# Patient Record
Sex: FEMALE | Race: WHITE | Employment: OTHER | URBAN - METROPOLITAN AREA
[De-identification: names, ages, dates, MRNs, and addresses within clinical notes are randomized per-mention and may not be internally consistent; named-entity substitution may affect disease eponyms.]

---

## 2020-08-13 ENCOUNTER — APPOINTMENT (OUTPATIENT)
Dept: CT IMAGING | Age: 60
End: 2020-08-13
Payer: COMMERCIAL

## 2020-08-13 ENCOUNTER — HOSPITAL ENCOUNTER (OUTPATIENT)
Age: 60
Setting detail: OBSERVATION
Discharge: HOME | End: 2020-08-14
Attending: EMERGENCY MEDICINE | Admitting: INTERNAL MEDICINE
Payer: COMMERCIAL

## 2020-08-13 PROBLEM — N30.01 ACUTE CYSTITIS WITH HEMATURIA: Status: ACTIVE | Noted: 2020-08-13

## 2020-08-13 PROBLEM — E11.65 TYPE 2 DIABETES MELLITUS WITH HYPERGLYCEMIA (HCC): Status: ACTIVE | Noted: 2020-08-13

## 2020-08-13 LAB
-: ABNORMAL
ABSOLUTE EOS #: 0 K/UL (ref 0–0.4)
ABSOLUTE IMMATURE GRANULOCYTE: ABNORMAL K/UL (ref 0–0.3)
ABSOLUTE LYMPH #: 1.2 K/UL (ref 1–4.8)
ABSOLUTE MONO #: 0.2 K/UL (ref 0.1–1.2)
ALBUMIN SERPL-MCNC: 4.2 G/DL (ref 3.5–5.2)
ALBUMIN/GLOBULIN RATIO: 1.3 (ref 1–2.5)
ALP BLD-CCNC: 114 U/L (ref 35–104)
ALT SERPL-CCNC: 21 U/L (ref 5–33)
AMORPHOUS: ABNORMAL
AMYLASE: 59 U/L (ref 28–100)
ANION GAP SERPL CALCULATED.3IONS-SCNC: 11 MMOL/L (ref 9–17)
AST SERPL-CCNC: 17 U/L
BACTERIA: ABNORMAL
BASOPHILS # BLD: 0 % (ref 0–2)
BASOPHILS ABSOLUTE: 0 K/UL (ref 0–0.2)
BILIRUB SERPL-MCNC: 0.32 MG/DL (ref 0.3–1.2)
BILIRUBIN DIRECT: 0.12 MG/DL
BILIRUBIN URINE: NEGATIVE
BILIRUBIN, INDIRECT: 0.2 MG/DL (ref 0–1)
BUN BLDV-MCNC: 14 MG/DL (ref 8–23)
BUN/CREAT BLD: ABNORMAL (ref 9–20)
CALCIUM SERPL-MCNC: 9.2 MG/DL (ref 8.6–10.4)
CASTS UA: ABNORMAL /LPF
CHLORIDE BLD-SCNC: 102 MMOL/L (ref 98–107)
CO2: 27 MMOL/L (ref 20–31)
COLOR: YELLOW
COMMENT UA: ABNORMAL
CREAT SERPL-MCNC: 0.66 MG/DL (ref 0.5–0.9)
CRYSTALS, UA: ABNORMAL /HPF
DIFFERENTIAL TYPE: ABNORMAL
EOSINOPHILS RELATIVE PERCENT: 0 % (ref 1–4)
EPITHELIAL CELLS UA: ABNORMAL /HPF (ref 0–5)
ESTIMATED AVERAGE GLUCOSE: 194 MG/DL
GFR AFRICAN AMERICAN: >60 ML/MIN
GFR NON-AFRICAN AMERICAN: >60 ML/MIN
GFR SERPL CREATININE-BSD FRML MDRD: ABNORMAL ML/MIN/{1.73_M2}
GFR SERPL CREATININE-BSD FRML MDRD: ABNORMAL ML/MIN/{1.73_M2}
GLOBULIN: ABNORMAL G/DL (ref 1.5–3.8)
GLUCOSE BLD-MCNC: 119 MG/DL (ref 65–105)
GLUCOSE BLD-MCNC: 252 MG/DL (ref 65–105)
GLUCOSE BLD-MCNC: 382 MG/DL (ref 70–99)
GLUCOSE BLD-MCNC: 91 MG/DL (ref 65–105)
GLUCOSE URINE: ABNORMAL
HBA1C MFR BLD: 8.4 % (ref 4–6)
HCT VFR BLD CALC: 39 % (ref 36–46)
HCT VFR BLD CALC: 42.9 % (ref 36–46)
HEMOGLOBIN: 13.6 G/DL (ref 12–16)
HEMOGLOBIN: 14.6 G/DL (ref 12–16)
IMMATURE GRANULOCYTES: ABNORMAL %
KETONES, URINE: NEGATIVE
LACTIC ACID: 1.9 MMOL/L (ref 0.5–2.2)
LEUKOCYTE ESTERASE, URINE: NEGATIVE
LIPASE: 79 U/L (ref 13–60)
LYMPHOCYTES # BLD: 15 % (ref 24–44)
MCH RBC QN AUTO: 32.5 PG (ref 26–34)
MCHC RBC AUTO-ENTMCNC: 34.1 G/DL (ref 31–37)
MCV RBC AUTO: 95.1 FL (ref 80–100)
MONOCYTES # BLD: 2 % (ref 2–11)
MUCUS: ABNORMAL
NITRITE, URINE: NEGATIVE
NRBC AUTOMATED: ABNORMAL PER 100 WBC
OTHER OBSERVATIONS UA: ABNORMAL
PDW BLD-RTO: 12.3 % (ref 12.5–15.4)
PH UA: 7 (ref 5–8)
PLATELET # BLD: 209 K/UL (ref 140–450)
PLATELET ESTIMATE: ABNORMAL
PMV BLD AUTO: 8 FL (ref 6–12)
POTASSIUM SERPL-SCNC: 4.2 MMOL/L (ref 3.7–5.3)
PROTEIN UA: NEGATIVE
RBC # BLD: 4.51 M/UL (ref 4–5.2)
RBC # BLD: ABNORMAL 10*6/UL
RBC UA: ABNORMAL /HPF (ref 0–2)
RENAL EPITHELIAL, UA: ABNORMAL /HPF
SARS-COV-2, PCR: NORMAL
SARS-COV-2, RAPID: NOT DETECTED
SARS-COV-2: NORMAL
SEG NEUTROPHILS: 83 % (ref 36–66)
SEGMENTED NEUTROPHILS ABSOLUTE COUNT: 6.5 K/UL (ref 1.8–7.7)
SODIUM BLD-SCNC: 140 MMOL/L (ref 135–144)
SOURCE: NORMAL
SPECIFIC GRAVITY UA: 1.01 (ref 1–1.03)
TOTAL PROTEIN: 7.4 G/DL (ref 6.4–8.3)
TRICHOMONAS: ABNORMAL
TROPONIN INTERP: ABNORMAL
TROPONIN T: ABNORMAL NG/ML
TROPONIN, HIGH SENSITIVITY: 15 NG/L (ref 0–14)
TURBIDITY: CLEAR
URINE HGB: ABNORMAL
UROBILINOGEN, URINE: NORMAL
WBC # BLD: 7.9 K/UL (ref 3.5–11)
WBC # BLD: ABNORMAL 10*3/UL
WBC UA: ABNORMAL /HPF (ref 0–5)
YEAST: ABNORMAL

## 2020-08-13 PROCEDURE — 85014 HEMATOCRIT: CPT

## 2020-08-13 PROCEDURE — 83605 ASSAY OF LACTIC ACID: CPT

## 2020-08-13 PROCEDURE — 96372 THER/PROPH/DIAG INJ SC/IM: CPT

## 2020-08-13 PROCEDURE — 36415 COLL VENOUS BLD VENIPUNCTURE: CPT

## 2020-08-13 PROCEDURE — 84484 ASSAY OF TROPONIN QUANT: CPT

## 2020-08-13 PROCEDURE — APPSS180 APP SPLIT SHARED TIME > 60 MINUTES: Performed by: NURSE PRACTITIONER

## 2020-08-13 PROCEDURE — 99244 OFF/OP CNSLTJ NEW/EST MOD 40: CPT | Performed by: INTERNAL MEDICINE

## 2020-08-13 PROCEDURE — 6370000000 HC RX 637 (ALT 250 FOR IP): Performed by: EMERGENCY MEDICINE

## 2020-08-13 PROCEDURE — 86003 ALLG SPEC IGE CRUDE XTRC EA: CPT

## 2020-08-13 PROCEDURE — 96375 TX/PRO/DX INJ NEW DRUG ADDON: CPT

## 2020-08-13 PROCEDURE — 2580000003 HC RX 258: Performed by: INTERNAL MEDICINE

## 2020-08-13 PROCEDURE — 2580000003 HC RX 258: Performed by: NURSE PRACTITIONER

## 2020-08-13 PROCEDURE — G0378 HOSPITAL OBSERVATION PER HR: HCPCS

## 2020-08-13 PROCEDURE — 6370000000 HC RX 637 (ALT 250 FOR IP): Performed by: NURSE PRACTITIONER

## 2020-08-13 PROCEDURE — 6360000002 HC RX W HCPCS: Performed by: INTERNAL MEDICINE

## 2020-08-13 PROCEDURE — 82150 ASSAY OF AMYLASE: CPT

## 2020-08-13 PROCEDURE — 83690 ASSAY OF LIPASE: CPT

## 2020-08-13 PROCEDURE — 99219 PR INITIAL OBSERVATION CARE/DAY 50 MINUTES: CPT | Performed by: INTERNAL MEDICINE

## 2020-08-13 PROCEDURE — 82785 ASSAY OF IGE: CPT

## 2020-08-13 PROCEDURE — 83036 HEMOGLOBIN GLYCOSYLATED A1C: CPT

## 2020-08-13 PROCEDURE — C9113 INJ PANTOPRAZOLE SODIUM, VIA: HCPCS | Performed by: INTERNAL MEDICINE

## 2020-08-13 PROCEDURE — 96365 THER/PROPH/DIAG IV INF INIT: CPT

## 2020-08-13 PROCEDURE — 83516 IMMUNOASSAY NONANTIBODY: CPT

## 2020-08-13 PROCEDURE — 81001 URINALYSIS AUTO W/SCOPE: CPT

## 2020-08-13 PROCEDURE — 80076 HEPATIC FUNCTION PANEL: CPT

## 2020-08-13 PROCEDURE — 6360000002 HC RX W HCPCS: Performed by: EMERGENCY MEDICINE

## 2020-08-13 PROCEDURE — 93005 ELECTROCARDIOGRAM TRACING: CPT | Performed by: EMERGENCY MEDICINE

## 2020-08-13 PROCEDURE — U0002 COVID-19 LAB TEST NON-CDC: HCPCS

## 2020-08-13 PROCEDURE — 85025 COMPLETE CBC W/AUTO DIFF WBC: CPT

## 2020-08-13 PROCEDURE — 80048 BASIC METABOLIC PNL TOTAL CA: CPT

## 2020-08-13 PROCEDURE — 96374 THER/PROPH/DIAG INJ IV PUSH: CPT

## 2020-08-13 PROCEDURE — 2580000003 HC RX 258: Performed by: EMERGENCY MEDICINE

## 2020-08-13 PROCEDURE — 82947 ASSAY GLUCOSE BLOOD QUANT: CPT

## 2020-08-13 PROCEDURE — 99285 EMERGENCY DEPT VISIT HI MDM: CPT

## 2020-08-13 PROCEDURE — 74176 CT ABD & PELVIS W/O CONTRAST: CPT

## 2020-08-13 PROCEDURE — 85018 HEMOGLOBIN: CPT

## 2020-08-13 PROCEDURE — 6360000002 HC RX W HCPCS: Performed by: NURSE PRACTITIONER

## 2020-08-13 RX ORDER — MORPHINE SULFATE 2 MG/ML
2 INJECTION, SOLUTION INTRAMUSCULAR; INTRAVENOUS
Status: DISCONTINUED | OUTPATIENT
Start: 2020-08-13 | End: 2020-08-14 | Stop reason: HOSPADM

## 2020-08-13 RX ORDER — DEXTROSE MONOHYDRATE 25 G/50ML
12.5 INJECTION, SOLUTION INTRAVENOUS PRN
Status: DISCONTINUED | OUTPATIENT
Start: 2020-08-13 | End: 2020-08-14 | Stop reason: HOSPADM

## 2020-08-13 RX ORDER — SODIUM CHLORIDE 9 MG/ML
INJECTION, SOLUTION INTRAVENOUS CONTINUOUS
Status: DISCONTINUED | OUTPATIENT
Start: 2020-08-13 | End: 2020-08-14

## 2020-08-13 RX ORDER — ATORVASTATIN CALCIUM 20 MG/1
20 TABLET, FILM COATED ORAL DAILY
COMMUNITY

## 2020-08-13 RX ORDER — 0.9 % SODIUM CHLORIDE 0.9 %
1000 INTRAVENOUS SOLUTION INTRAVENOUS ONCE
Status: COMPLETED | OUTPATIENT
Start: 2020-08-13 | End: 2020-08-13

## 2020-08-13 RX ORDER — ONDANSETRON 2 MG/ML
4 INJECTION INTRAMUSCULAR; INTRAVENOUS ONCE
Status: COMPLETED | OUTPATIENT
Start: 2020-08-13 | End: 2020-08-13

## 2020-08-13 RX ORDER — MAGNESIUM SULFATE 1 G/100ML
1 INJECTION INTRAVENOUS PRN
Status: DISCONTINUED | OUTPATIENT
Start: 2020-08-13 | End: 2020-08-14 | Stop reason: HOSPADM

## 2020-08-13 RX ORDER — NICOTINE POLACRILEX 4 MG
15 LOZENGE BUCCAL PRN
Status: DISCONTINUED | OUTPATIENT
Start: 2020-08-13 | End: 2020-08-14 | Stop reason: HOSPADM

## 2020-08-13 RX ORDER — DULAGLUTIDE 1.5 MG/.5ML
1.5 INJECTION, SOLUTION SUBCUTANEOUS WEEKLY
Status: ON HOLD | COMMUNITY
End: 2020-08-14 | Stop reason: HOSPADM

## 2020-08-13 RX ORDER — SODIUM CHLORIDE 9 MG/ML
10 INJECTION INTRAVENOUS 2 TIMES DAILY
Status: DISCONTINUED | OUTPATIENT
Start: 2020-08-13 | End: 2020-08-14 | Stop reason: HOSPADM

## 2020-08-13 RX ORDER — MORPHINE SULFATE 2 MG/ML
2 INJECTION, SOLUTION INTRAMUSCULAR; INTRAVENOUS EVERY 4 HOURS PRN
Status: DISCONTINUED | OUTPATIENT
Start: 2020-08-13 | End: 2020-08-13 | Stop reason: DRUGHIGH

## 2020-08-13 RX ORDER — POTASSIUM CHLORIDE 7.45 MG/ML
10 INJECTION INTRAVENOUS PRN
Status: DISCONTINUED | OUTPATIENT
Start: 2020-08-13 | End: 2020-08-14 | Stop reason: HOSPADM

## 2020-08-13 RX ORDER — SODIUM CHLORIDE 0.9 % (FLUSH) 0.9 %
10 SYRINGE (ML) INJECTION EVERY 12 HOURS SCHEDULED
Status: DISCONTINUED | OUTPATIENT
Start: 2020-08-13 | End: 2020-08-14 | Stop reason: HOSPADM

## 2020-08-13 RX ORDER — ATORVASTATIN CALCIUM 10 MG/1
20 TABLET, FILM COATED ORAL DAILY
Status: DISCONTINUED | OUTPATIENT
Start: 2020-08-13 | End: 2020-08-14 | Stop reason: HOSPADM

## 2020-08-13 RX ORDER — ACETAMINOPHEN 325 MG/1
650 TABLET ORAL EVERY 6 HOURS PRN
Status: DISCONTINUED | OUTPATIENT
Start: 2020-08-13 | End: 2020-08-14 | Stop reason: HOSPADM

## 2020-08-13 RX ORDER — SODIUM CHLORIDE 0.9 % (FLUSH) 0.9 %
10 SYRINGE (ML) INJECTION PRN
Status: DISCONTINUED | OUTPATIENT
Start: 2020-08-13 | End: 2020-08-14 | Stop reason: HOSPADM

## 2020-08-13 RX ORDER — MORPHINE SULFATE 2 MG/ML
2 INJECTION, SOLUTION INTRAMUSCULAR; INTRAVENOUS ONCE
Status: COMPLETED | OUTPATIENT
Start: 2020-08-13 | End: 2020-08-13

## 2020-08-13 RX ORDER — POTASSIUM CHLORIDE 20 MEQ/1
40 TABLET, EXTENDED RELEASE ORAL PRN
Status: DISCONTINUED | OUTPATIENT
Start: 2020-08-13 | End: 2020-08-14 | Stop reason: HOSPADM

## 2020-08-13 RX ORDER — PROMETHAZINE HYDROCHLORIDE 25 MG/1
12.5 TABLET ORAL EVERY 6 HOURS PRN
Status: DISCONTINUED | OUTPATIENT
Start: 2020-08-13 | End: 2020-08-14 | Stop reason: HOSPADM

## 2020-08-13 RX ORDER — ACETAMINOPHEN 650 MG/1
650 SUPPOSITORY RECTAL EVERY 6 HOURS PRN
Status: DISCONTINUED | OUTPATIENT
Start: 2020-08-13 | End: 2020-08-14 | Stop reason: HOSPADM

## 2020-08-13 RX ORDER — DEXTROSE MONOHYDRATE 50 MG/ML
100 INJECTION, SOLUTION INTRAVENOUS PRN
Status: DISCONTINUED | OUTPATIENT
Start: 2020-08-13 | End: 2020-08-14 | Stop reason: HOSPADM

## 2020-08-13 RX ORDER — PANTOPRAZOLE SODIUM 40 MG/10ML
40 INJECTION, POWDER, LYOPHILIZED, FOR SOLUTION INTRAVENOUS 2 TIMES DAILY
Status: DISCONTINUED | OUTPATIENT
Start: 2020-08-13 | End: 2020-08-14 | Stop reason: HOSPADM

## 2020-08-13 RX ORDER — NICOTINE 21 MG/24HR
1 PATCH, TRANSDERMAL 24 HOURS TRANSDERMAL DAILY PRN
Status: DISCONTINUED | OUTPATIENT
Start: 2020-08-13 | End: 2020-08-14 | Stop reason: HOSPADM

## 2020-08-13 RX ORDER — POLYETHYLENE GLYCOL 3350 17 G/17G
17 POWDER, FOR SOLUTION ORAL DAILY PRN
Status: DISCONTINUED | OUTPATIENT
Start: 2020-08-13 | End: 2020-08-14 | Stop reason: HOSPADM

## 2020-08-13 RX ORDER — ONDANSETRON 2 MG/ML
4 INJECTION INTRAMUSCULAR; INTRAVENOUS EVERY 6 HOURS PRN
Status: DISCONTINUED | OUTPATIENT
Start: 2020-08-13 | End: 2020-08-14 | Stop reason: HOSPADM

## 2020-08-13 RX ADMIN — SODIUM CHLORIDE: 9 INJECTION, SOLUTION INTRAVENOUS at 23:51

## 2020-08-13 RX ADMIN — ONDANSETRON 4 MG: 2 INJECTION INTRAMUSCULAR; INTRAVENOUS at 10:07

## 2020-08-13 RX ADMIN — SODIUM CHLORIDE: 9 INJECTION, SOLUTION INTRAVENOUS at 13:35

## 2020-08-13 RX ADMIN — ENOXAPARIN SODIUM 40 MG: 40 INJECTION SUBCUTANEOUS at 13:48

## 2020-08-13 RX ADMIN — ATORVASTATIN CALCIUM 20 MG: 10 TABLET, FILM COATED ORAL at 15:44

## 2020-08-13 RX ADMIN — SODIUM CHLORIDE 10 ML: 9 INJECTION, SOLUTION INTRAMUSCULAR; INTRAVENOUS; SUBCUTANEOUS at 20:07

## 2020-08-13 RX ADMIN — CEFTRIAXONE SODIUM 1 G: 1 INJECTION, POWDER, FOR SOLUTION INTRAMUSCULAR; INTRAVENOUS at 13:48

## 2020-08-13 RX ADMIN — SODIUM CHLORIDE 1000 ML: 9 INJECTION, SOLUTION INTRAVENOUS at 10:06

## 2020-08-13 RX ADMIN — INSULIN HUMAN 8 UNITS: 100 INJECTION, SOLUTION PARENTERAL at 11:14

## 2020-08-13 RX ADMIN — INSULIN LISPRO 6 UNITS: 100 INJECTION, SOLUTION INTRAVENOUS; SUBCUTANEOUS at 13:47

## 2020-08-13 RX ADMIN — MORPHINE SULFATE 2 MG: 2 INJECTION, SOLUTION INTRAMUSCULAR; INTRAVENOUS at 10:06

## 2020-08-13 RX ADMIN — PANTOPRAZOLE SODIUM 40 MG: 40 INJECTION, POWDER, FOR SOLUTION INTRAVENOUS at 20:07

## 2020-08-13 SDOH — HEALTH STABILITY: MENTAL HEALTH: HOW OFTEN DO YOU HAVE A DRINK CONTAINING ALCOHOL?: NEVER

## 2020-08-13 ASSESSMENT — ENCOUNTER SYMPTOMS
WHEEZING: 0
BACK PAIN: 1
STRIDOR: 0
CONSTIPATION: 0
NAUSEA: 1
ABDOMINAL PAIN: 1
DIARRHEA: 1
VOMITING: 1
BLOOD IN STOOL: 0
SHORTNESS OF BREATH: 0
COUGH: 0

## 2020-08-13 ASSESSMENT — PAIN DESCRIPTION - LOCATION: LOCATION: ABDOMEN

## 2020-08-13 ASSESSMENT — PAIN SCALES - GENERAL
PAINLEVEL_OUTOF10: 6
PAINLEVEL_OUTOF10: 6
PAINLEVEL_OUTOF10: 0
PAINLEVEL_OUTOF10: 0

## 2020-08-13 NOTE — ED NOTES
Message sent via [facility] to [facility] on call regarding potential admission per ER MD request.     Amber Samano RN  08/13/20 8726

## 2020-08-13 NOTE — H&P
[facility]    HISTORY AND PHYSICAL EXAMINATION            Date:   8/13/2020  Patient name:  Mago Kirby  Date of admission:  8/13/2020  9:15 AM  MRN:   [id number]  Account:  [de-identified]  YOB: 1960  PCP:    No primary care provider on file. Room:   [address] [place] [address]  Code Status:    Full Code    Chief Complaint:     Chief Complaint   Patient presents with    Nausea    Emesis       History Obtained From:     patient    History of Present Illness:     Mago Kirby is a 63 y.o. Non-/non  female who presents with Nausea and Emesis   and is admitted to the hospital for the management of Type 2 diabetes mellitus with hyperglycemia ([facility] [address] Patient presented to the emergency room with complaints of right lower back pain, nausea, vomiting and diarrhea that started approximately 4:00 this morning. She described her right lower back pain as sharp and stabbing, she said her nausea was initially undigested food but then became bloody. She describes her diarrhea as soft and brown. She also reports urinary frequency and blood when she wiped after voiding earlier today. She denies fever, chills, chest pain or shortness of breath. The patient is a diabetic and normally takes Glucophage twice a day and Trulicity but has not had her Trulicity since April. She states she came back to this area from [place] to take care of her ailing mother-in-law because of insurance issues and labs she ran out. She states she has been taking the Glucophage but mostly once a day. She states on average when she checks her blood sugar its between 200-260. Her only other history is hyperlipidemia. She does state that she is been told in the past she may be lactose intolerant. She states she had an EGD/colonoscopy in her 25s and was told she had gallstones at that time. On arrival to the ER vitals are stable.   CT of the abdomen and pelvis without contrast shows no evidence of acute intra-abdominal or intrapelvic pathology. BUN and creatinine within normal limits blood glucose was 382. She was given 8 units of regular insulin and her sugar came down to 252. Amylase 59 lipase 79. CBC unremarkable. COVID-19 swab negative. Urine is positive for 3+ glucose, 20-50 RBCs, few bacteria and a large amount hemoglobin. EKG shows normal sinus rhythm with PACs, left axis deviation, and a left bundle branch block. Past Medical History:     Past Medical History:   Diagnosis Date    Diabetes mellitus ([address]     Hyperlipidemia     Normal pressure glaucoma         Past Surgical History:     Past Surgical History:   Procedure Laterality Date    ABDOMEN SURGERY      CHOLECYSTECTOMY          Medications Prior to Admission:     Prior to Admission medications    Medication Sig Start Date End Date Taking? Authorizing Provider   metFORMIN (GLUCOPHAGE) 500 MG tablet Take 500 mg by mouth 2 times daily (with meals)   Yes Historical Provider, MD   atorvastatin (LIPITOR) 20 MG tablet Take 20 mg by mouth daily   Yes Historical Provider, MD   Dulaglutide (TRULICITY) 1.5 VW/7.0WE SOPN Inject 1.5 mg into the skin once a week   Yes Historical Provider, MD        Allergies:     Patient has no known allergies. Social History:     Tobacco:    reports that she has never smoked. She has never used smokeless tobacco.  Alcohol:      reports no history of alcohol use. Drug Use:  reports no history of drug use. Family History:     Family History   Problem Relation Age of Onset   [facility] Glaucoma Mother     Hypertension Father     Stroke Father     Dementia Father     No Known Problems Sister     Diabetes Brother     Hypertension Brother     Other Other         frequent kidney stones       Review of Systems:     Positive and Negative as described in HPI. Review of Systems   Constitutional: Negative for chills, diaphoresis and fever.    HENT: Negative for congestion and hearing loss. Respiratory: Negative for cough, shortness of breath, wheezing and stridor. Cardiovascular: Negative for chest pain, palpitations and leg swelling. Gastrointestinal: Positive for abdominal pain, diarrhea, nausea and vomiting. Negative for blood in stool and constipation. Genitourinary: Positive for frequency and hematuria. Negative for dysuria. Musculoskeletal: Positive for back pain. Negative for myalgias. Skin: Negative for rash. Neurological: Negative for dizziness, seizures and headaches. Psychiatric/Behavioral: The patient is not nervous/anxious. Physical Exam:   BP (!) 150/81   Pulse 85   Temp 97.5 °F (36.4 °C) (Oral)   Resp 18   Ht 5' 5\" (1.651 m)   Wt 215 lb (97.5 kg)   SpO2 98%   BMI 35.78 kg/m²   Temp (24hrs), Av.9 °F (36.6 °C), Min:97.5 °F (36.4 °C), Max:98.7 °F (37.1 °C)    Recent Labs     20  1328 20  1559   POCGLU 252* 119*       Intake/Output Summary (Last 24 hours) at 2020 1633  Last data filed at 2020 1546  Gross per 24 hour   Intake 1000 ml   Output 600 ml   Net 400 ml       Physical Exam  Vitals signs and nursing note reviewed. Constitutional:       Appearance: Normal appearance. HENT:      Mouth/Throat:      Mouth: Mucous membranes are moist.   Eyes:      Extraocular Movements: Extraocular movements intact. Neck:      Musculoskeletal: Normal range of motion. Cardiovascular:      Rate and Rhythm: Normal rate and regular rhythm. Pulses: Normal pulses. Heart sounds: Normal heart sounds. Pulmonary:      Effort: Pulmonary effort is normal.      Breath sounds: Normal breath sounds. Abdominal:      General: Bowel sounds are normal.      Palpations: Abdomen is soft. Tenderness: There is abdominal tenderness. There is right CVA tenderness. Comments:  The patient reports lower quadrant abdominal pain initially but that is resolved since pain medicines in the ER   Musculoskeletal:      Right lower le+ 32.5 26 - 34 pg    MCHC 34.1 31 - 37 g/dL    RDW 12.3 (L) 12.5 - 15.4 %    Platelets 824 991 - 071 k/uL    MPV 8.0 6.0 - 12.0 fL    NRBC Automated NOT REPORTED per 100 WBC    Differential Type NOT REPORTED     Seg Neutrophils 83 (H) 36 - 66 %    Lymphocytes 15 (L) 24 - 44 %    Monocytes 2 2 - 11 %    Eosinophils % 0 (L) 1 - 4 %    Basophils 0 0 - 2 %    Immature Granulocytes NOT REPORTED 0 %    Segs Absolute 6.50 1.8 - 7.7 k/uL    Absolute Lymph # 1.20 1.0 - 4.8 k/uL    Absolute Mono # 0.20 0.1 - 1.2 k/uL    Absolute Eos # 0.00 0.0 - 0.4 k/uL    Basophils Absolute 0.00 0.0 - 0.2 k/uL    Absolute Immature Granulocyte NOT REPORTED 0.00 - 0.30 k/uL    WBC Morphology NOT REPORTED     RBC Morphology NOT REPORTED     Platelet Estimate NOT REPORTED    Lactic Acid    Collection Time: 08/13/20 10:00 AM   Result Value Ref Range    Lactic Acid 1.9 0.5 - 2.2 mmol/L   Troponin    Collection Time: 08/13/20 10:00 AM   Result Value Ref Range    Troponin, High Sensitivity 15 (H) 0 - 14 ng/L    Troponin T NOT REPORTED <0.03 ng/mL    Troponin Interp NOT REPORTED    Urinalysis Reflex to Culture    Collection Time: 08/13/20 10:00 AM   Result Value Ref Range    Color, UA YELLOW YELLOW    Turbidity UA CLEAR CLEAR    Glucose, Ur 3+ (A) NEGATIVE    Bilirubin Urine NEGATIVE NEGATIVE    Ketones, Urine NEGATIVE NEGATIVE    Specific Gravity, UA 1.015 1.005 - 1.030    Urine Hgb LARGE (A) NEGATIVE    pH, UA 7.0 5.0 - 8.0    Protein, UA NEGATIVE NEGATIVE    Urobilinogen, Urine Normal Normal    Nitrite, Urine NEGATIVE NEGATIVE    Leukocyte Esterase, Urine NEGATIVE NEGATIVE    Urinalysis Comments NOT REPORTED    Microscopic Urinalysis    Collection Time: 08/13/20 10:00 AM   Result Value Ref Range    -          WBC, UA 0 TO 2 0 - 5 /HPF    RBC, UA 20 TO 50 0 - 2 /HPF    Casts UA NOT REPORTED /LPF    Crystals, UA NOT REPORTED None /HPF    Epithelial Cells UA 0 TO 2 0 - 5 /HPF    Renal Epithelial, UA NOT REPORTED 0 /HPF    Bacteria, UA FEW (A) None hematuria; Rocephin 1 g IV every 24 hours pending urine culture    Lovenox for DVT prophylaxis    Antiemetics and pain medications as needed    Consult GI related to complaint of hematemesis    Follow labs    Monitor I&O and telemtry      Consultations:   [id number] , APRN - CNP  8/13/2020  4:33 PM    Copy sent to Dr. Titus Skiff primary care provider on file.

## 2020-08-13 NOTE — PLAN OF CARE
Problem: Infection:  Goal: Will remain free from infection  Description: Will remain free from infection  Outcome: Ongoing     Problem: Daily Care:  Goal: Daily care needs are met  Description: Daily care needs are met  Outcome: Ongoing     Problem: Pain:  Goal: Patient's pain/discomfort is manageable  Description: Patient's pain/discomfort is manageable  Outcome: Ongoing     Problem: Skin Integrity:  Goal: Skin integrity will stabilize  Description: Skin integrity will stabilize  Outcome: Ongoing     Problem: Discharge Planning:  Goal: Patients continuum of care needs are met  Description: Patients continuum of care needs are met  Outcome: Ongoing

## 2020-08-13 NOTE — ED PROVIDER NOTES
[id number] [facility]  [address] [facility] [address]  Phone: [phone]  Fax: [phone]        Pt Name: Sydney Torres  MRN: [id number]  [place] 1960  Date of evaluation: 8/13/20      CHIEF COMPLAINT     Chief Complaint   Patient presents with    Nausea    Emesis         HISTORY OF PRESENT ILLNESS  (Location/Symptom, Timing/Onset, Context/Setting, Quality, Duration, Modifying Factors, Severity.)    Sydney Torres is a 63 y.o. female who presents with abdominal pain. The patient states that she is a diabetic she ran out of her Trulicity a few months ago she has been taking Glucophage her blood sugars been elevated and this morning she had abdominal pain with nausea vomiting and diarrhea the patient questions if there may have been some blood in her emesis with some streaks of blood denies any chest pain or shortness of breath also have some right flank pain denies any dysuria no fever no chills nothing she does makes her symptoms better or worse      REVIEW OF SYSTEMS    (2-9 systems for level 4, 10 or more for level 5)     Review of Systems   Gastrointestinal: Positive for abdominal pain, diarrhea, nausea and vomiting. Genitourinary: Positive for hematuria. All other systems reviewed and are negative. PAST MEDICAL HISTORY    has a past medical history of Diabetes mellitus ([address] and Hyperlipidemia. SURGICAL HISTORY      has a past surgical history that includes Cholecystectomy and Abdomen surgery. CURRENTMEDICATIONS       Previous Medications    ATORVASTATIN (LIPITOR) 20 MG TABLET    Take 20 mg by mouth daily    DULAGLUTIDE (TRULICITY) 1.5 MR/3.7UP SOPN    Inject 1.5 mg into the skin once a week    METFORMIN (GLUCOPHAGE) 500 MG TABLET    Take 500 mg by mouth 2 times daily (with meals)       ALLERGIES     has No Known Allergies. FAMILY HISTORY     has no family status information on file. family history is not on file.     SOCIAL HISTORY      reports that she has never smoked. She has never used smokeless tobacco. She reports that she does not drink alcohol or use drugs. PHYSICAL EXAM    (up to 7 for level 4, 8 or more for level 5)   INITIAL VITALS:  height is 5' 5\" (1.651 m) and weight is 97.5 kg (215 lb). Her oral temperature is 98.7 °F (37.1 °C). Her blood pressure is 145/87 (abnormal) and her pulse is 84. Her respiration is 16 and oxygen saturation is 97%. Physical Exam  Vitals signs and nursing note reviewed. Constitutional:       Appearance: Normal appearance. HENT:      Head: Normocephalic and atraumatic. Eyes:      Conjunctiva/sclera: Conjunctivae normal.   Neck:      Musculoskeletal: Normal range of motion and neck supple. Cardiovascular:      Rate and Rhythm: Normal rate and regular rhythm. Pulses: Normal pulses. Heart sounds: Normal heart sounds. Pulmonary:      Effort: Pulmonary effort is normal. No respiratory distress. Breath sounds: Normal breath sounds. No stridor. No wheezing, rhonchi or rales. Abdominal:      General: Bowel sounds are normal. There is no distension. Palpations: Abdomen is soft. There is no mass. Tenderness: There is no abdominal tenderness. There is right CVA tenderness. There is no guarding or rebound. Musculoskeletal: Normal range of motion. Skin:     General: Skin is warm and dry. Neurological:      General: No focal deficit present. Mental Status: She is alert.          DIFFERENTIAL DIAGNOSIS/ MDM:     I will establish an IV give the patient IV fluids check labs and EKG and a UA we will also get a CT of the abdomen and pelvis    DIAGNOSTIC RESULTS     EKG: All EKG's are interpreted by the Emergency Department Physician who either signs or Co-signs this chart in the [address] a cardiologist.      Interpreted by Robyn Cook MD     Rhythm: normal sinus   Rate: 90  Axis: -39  Ectopy: Premature atrial complex  Conduction: Left bundle branch block  ST Segments: no acute obstruction or inflammation.  No   free intraperitoneal air or fluid.  Small bowel loops are normal in caliber. No evidence for hiatal hernia.  Appendix is unremarkable. Pelvis: No evidence for free fluid. Retroperitoneum: Right adrenal gland is unremarkable.  Left adrenal adenoma. Bilateral peripelvic renal cysts.  Ureters are unobstructed.  Urinary bladder   is unremarkable. Vasculature:  The aorta is normal in caliber.  No definite lymphadenopathy   identified. Bones/Soft Tissues: No evidence for acute fracture or dislocation.  No lytic   or blastic lesions.                    LABS:  Results for orders placed or performed during the hospital encounter of 08/13/20   Hepatic Function Panel   Result Value Ref Range    Alb 4.2 3.5 - 5.2 g/dL    Alkaline Phosphatase 114 (H) 35 - 104 U/L    ALT 21 5 - 33 U/L    AST 17 <32 U/L    Total Bilirubin 0.32 0.3 - 1.2 mg/dL    Bilirubin, Direct 0.12 <0.31 mg/dL    Bilirubin, Indirect 0.20 0.00 - 1.00 mg/dL    Total Protein 7.4 6.4 - 8.3 g/dL    Globulin NOT REPORTED 1.5 - 3.8 g/dL    Albumin/Globulin Ratio 1.3 1.0 - 2.5   Lipase   Result Value Ref Range    Lipase 79 (H) 13 - 60 U/L   Basic Metabolic Panel   Result Value Ref Range    Glucose 382 (H) 70 - 99 mg/dL    BUN 14 8 - 23 mg/dL    CREATININE 0.66 0.50 - 0.90 mg/dL    Bun/Cre Ratio NOT REPORTED 9 - 20    Calcium 9.2 8.6 - 10.4 mg/dL    Sodium 140 135 - 144 mmol/L    Potassium 4.2 3.7 - 5.3 mmol/L    Chloride 102 98 - 107 mmol/L    CO2 27 20 - 31 mmol/L    Anion Gap 11 9 - 17 mmol/L    GFR Non-African American >60 >60 mL/min    GFR African American >60 >60 mL/min    GFR Comment          GFR Staging NOT REPORTED    CBC Auto Differential   Result Value Ref Range    WBC 7.9 3.5 - 11.0 k/uL    RBC 4.51 4.0 - 5.2 m/uL    Hemoglobin 14.6 12.0 - 16.0 g/dL    Hematocrit 42.9 36 - 46 %    MCV 95.1 80 - 100 fL    MCH 32.5 26 - 34 pg    MCHC 34.1 31 - 37 g/dL    RDW 12.3 (L) 12.5 - 15.4 %    Platelets [phone] - 500 many patient populations. Rapid screening tests are less sensitive than culture and if UTI is a clinical possibility, culture should be considered despite a negative urinalysis. Yeast, UA NOT REPORTED None   EKG 12 Lead   Result Value Ref Range    Ventricular Rate 90 BPM    Atrial Rate 90 BPM    P-R Interval 180 ms    QRS Duration 144 ms    Q-T Interval 400 ms    QTc Calculation (Bazett) 489 ms    P Axis 65 degrees    R Axis -39 degrees    T Axis 110 degrees           EMERGENCY DEPARTMENT COURSE:   Vitals:    Vitals:    08/13/20 0916   BP: (!) 145/87   Pulse: 84   Resp: 16   Temp: 98.7 °F (37.1 °C)   TempSrc: Oral   SpO2: 97%   Weight: 97.5 kg (215 lb)   Height: 5' 5\" (1.651 m)     -------------------------  BP: (!) 145/87, Temp: 98.7 °F (37.1 °C), Pulse: 84, Resp: 16      RE-EVALUATION:  Patient presents with lab work showing some pancreatitis hyperglycemia again she has not been taking her Loutrex that he has been taking her Glucophage she was given insulin subcu for her hyperglycemia there is no signs of diabetic ketoacidosis with a bicarb of 27 she has a slight elevation in her high-sensitivity troponin given all of this she does not have a local doctor I do feel the patient warrants admission for serial enzymes treatment of her pancreatitis and treatment of her hyperglycemia patient is agreeable to this plan so will contact my hospitalist for admission    CONSULTS:  My hospitalist is kind enough to admit the patient to her service    PROCEDURES:  None    FINAL IMPRESSION      1. Nausea and vomiting, intractability of vomiting not specified, unspecified vomiting type    2. Idiopathic acute pancreatitis without infection or necrosis    3. Hyperglycemia          DISPOSITION/PLAN   DISPOSITION        CONDITION ON DISPOSITION:   Stable    PATIENT REFERRED TO:  No follow-up provider specified.     DISCHARGE MEDICATIONS:  New Prescriptions    No medications on file       (Please note that portions of this note were completed with a voicerecognition program.  Efforts were made to edit the dictations but occasionally words are mis-transcribed.)    Biggs MD, F.A.C.E.P.   Attending Emergency Medicine Physician       Fernando Mendiola MD  08/13/20 7190

## 2020-08-14 ENCOUNTER — ANESTHESIA EVENT (OUTPATIENT)
Dept: OPERATING ROOM | Age: 60
End: 2020-08-14
Payer: COMMERCIAL

## 2020-08-14 ENCOUNTER — ANESTHESIA (OUTPATIENT)
Dept: OPERATING ROOM | Age: 60
End: 2020-08-14
Payer: COMMERCIAL

## 2020-08-14 VITALS
SYSTOLIC BLOOD PRESSURE: 172 MMHG | DIASTOLIC BLOOD PRESSURE: 92 MMHG | WEIGHT: 229.72 LBS | BODY MASS INDEX: 38.27 KG/M2 | HEIGHT: 65 IN | OXYGEN SATURATION: 97 % | TEMPERATURE: 98.2 F | RESPIRATION RATE: 16 BRPM | HEART RATE: 79 BPM

## 2020-08-14 VITALS — SYSTOLIC BLOOD PRESSURE: 127 MMHG | DIASTOLIC BLOOD PRESSURE: 60 MMHG | OXYGEN SATURATION: 86 %

## 2020-08-14 PROBLEM — K31.7 GASTRIC POLYPS: Status: ACTIVE | Noted: 2020-08-14

## 2020-08-14 PROBLEM — N30.01 ACUTE CYSTITIS WITH HEMATURIA: Status: RESOLVED | Noted: 2020-08-13 | Resolved: 2020-08-14

## 2020-08-14 PROBLEM — K29.70 GASTRITIS: Status: ACTIVE | Noted: 2020-08-14

## 2020-08-14 PROBLEM — D35.02 ADENOMA OF LEFT ADRENAL GLAND: Status: ACTIVE | Noted: 2020-08-14

## 2020-08-14 LAB
ALLERGEN CODFISH IGE: <0.34 KU/L (ref 0–0.34)
ALLERGEN COW MILK IGE: <0.34 KU/L (ref 0–0.34)
ALLERGEN EGG WHITE IGE: <0.34 KU/L (ref 0–0.34)
ALLERGEN GLUTEN IGE: <0.34 KU/L (ref 0–0.34)
ALLERGEN HAZELNUT: <0.34 KU/L (ref 0–0.34)
ALLERGEN PEANUT (F13) IGE: <0.34 KU/L (ref 0–0.34)
ALLERGEN SCALLOP IGE: <0.34 KU/L (ref 0–0.34)
ALLERGEN SOYBEAN IGE: <0.34 KU/L (ref 0–0.34)
ALLERGEN WALNUT IGE: <0.34 KU/L (ref 0–0.34)
ALLERGEN WHEAT IGE: <0.34 KU/L (ref 0–0.34)
ANION GAP SERPL CALCULATED.3IONS-SCNC: 9 MMOL/L (ref 9–17)
BUN BLDV-MCNC: 8 MG/DL (ref 8–23)
BUN/CREAT BLD: ABNORMAL (ref 9–20)
CALCIUM SERPL-MCNC: 8.4 MG/DL (ref 8.6–10.4)
CHLORIDE BLD-SCNC: 109 MMOL/L (ref 98–107)
CO2: 26 MMOL/L (ref 20–31)
CREAT SERPL-MCNC: 0.55 MG/DL (ref 0.5–0.9)
EKG ATRIAL RATE: 90 BPM
EKG P AXIS: 65 DEGREES
EKG P-R INTERVAL: 180 MS
EKG Q-T INTERVAL: 400 MS
EKG QRS DURATION: 144 MS
EKG QTC CALCULATION (BAZETT): 489 MS
EKG R AXIS: -39 DEGREES
EKG T AXIS: 110 DEGREES
EKG VENTRICULAR RATE: 90 BPM
GFR AFRICAN AMERICAN: >60 ML/MIN
GFR NON-AFRICAN AMERICAN: >60 ML/MIN
GFR SERPL CREATININE-BSD FRML MDRD: ABNORMAL ML/MIN/{1.73_M2}
GFR SERPL CREATININE-BSD FRML MDRD: ABNORMAL ML/MIN/{1.73_M2}
GLIADIN DEAMINIDATED PEPTIDE AB IGA: 1.2 U/ML
GLIADIN DEAMINIDATED PEPTIDE AB IGG: <0.4 U/ML
GLUCOSE BLD-MCNC: 122 MG/DL (ref 70–99)
GLUCOSE BLD-MCNC: 127 MG/DL (ref 65–105)
GLUCOSE BLD-MCNC: 128 MG/DL (ref 65–105)
HCT VFR BLD CALC: 37.8 % (ref 36–46)
HCT VFR BLD CALC: 38.6 % (ref 36–46)
HCT VFR BLD CALC: 40.3 % (ref 36–46)
HEMOGLOBIN: 13 G/DL (ref 12–16)
HEMOGLOBIN: 13.3 G/DL (ref 12–16)
HEMOGLOBIN: 13.8 G/DL (ref 12–16)
IGE: 67 IU/ML
INR BLD: 1
LIPASE: 14 U/L (ref 13–60)
MAGNESIUM: 2 MG/DL (ref 1.6–2.6)
MCH RBC QN AUTO: 33 PG (ref 26–34)
MCHC RBC AUTO-ENTMCNC: 34.5 G/DL (ref 31–37)
MCV RBC AUTO: 95.5 FL (ref 80–100)
NRBC AUTOMATED: ABNORMAL PER 100 WBC
PDW BLD-RTO: 12.2 % (ref 12.5–15.4)
PLATELET # BLD: 188 K/UL (ref 140–450)
PMV BLD AUTO: 8 FL (ref 6–12)
POTASSIUM SERPL-SCNC: 3.5 MMOL/L (ref 3.7–5.3)
PROTHROMBIN TIME: 10.1 SEC (ref 9.4–12.6)
RBC # BLD: 3.95 M/UL (ref 4–5.2)
SESAME SEED IGE: <0.34 KU/L (ref 0–0.34)
SHRIMP: <0.34 KU/L (ref 0–0.34)
SODIUM BLD-SCNC: 144 MMOL/L (ref 135–144)
TISSUE TRANSGLUTAMINASE ANTIBODY IGG: 0.7 U/ML
TISSUE TRANSGLUTAMINASE IGA: 1.3 U/ML
WBC # BLD: 7.2 K/UL (ref 3.5–11)

## 2020-08-14 PROCEDURE — 83735 ASSAY OF MAGNESIUM: CPT

## 2020-08-14 PROCEDURE — 2580000003 HC RX 258: Performed by: NURSE ANESTHETIST, CERTIFIED REGISTERED

## 2020-08-14 PROCEDURE — 3609012400 HC EGD TRANSORAL BIOPSY SINGLE/MULTIPLE: Performed by: INTERNAL MEDICINE

## 2020-08-14 PROCEDURE — 96375 TX/PRO/DX INJ NEW DRUG ADDON: CPT

## 2020-08-14 PROCEDURE — 88305 TISSUE EXAM BY PATHOLOGIST: CPT

## 2020-08-14 PROCEDURE — 96376 TX/PRO/DX INJ SAME DRUG ADON: CPT

## 2020-08-14 PROCEDURE — 43239 EGD BIOPSY SINGLE/MULTIPLE: CPT | Performed by: INTERNAL MEDICINE

## 2020-08-14 PROCEDURE — 6360000002 HC RX W HCPCS: Performed by: NURSE PRACTITIONER

## 2020-08-14 PROCEDURE — 36415 COLL VENOUS BLD VENIPUNCTURE: CPT

## 2020-08-14 PROCEDURE — APPNB45 APP NON BILLABLE 31-45 MINUTES: Performed by: NURSE PRACTITIONER

## 2020-08-14 PROCEDURE — 2580000003 HC RX 258: Performed by: NURSE PRACTITIONER

## 2020-08-14 PROCEDURE — 3700000001 HC ADD 15 MINUTES (ANESTHESIA): Performed by: INTERNAL MEDICINE

## 2020-08-14 PROCEDURE — 6360000002 HC RX W HCPCS: Performed by: NURSE ANESTHETIST, CERTIFIED REGISTERED

## 2020-08-14 PROCEDURE — 80048 BASIC METABOLIC PNL TOTAL CA: CPT

## 2020-08-14 PROCEDURE — 85018 HEMOGLOBIN: CPT

## 2020-08-14 PROCEDURE — 85014 HEMATOCRIT: CPT

## 2020-08-14 PROCEDURE — 83690 ASSAY OF LIPASE: CPT

## 2020-08-14 PROCEDURE — 2709999900 HC NON-CHARGEABLE SUPPLY: Performed by: INTERNAL MEDICINE

## 2020-08-14 PROCEDURE — 82947 ASSAY GLUCOSE BLOOD QUANT: CPT

## 2020-08-14 PROCEDURE — G0378 HOSPITAL OBSERVATION PER HR: HCPCS

## 2020-08-14 PROCEDURE — 85610 PROTHROMBIN TIME: CPT

## 2020-08-14 PROCEDURE — 3700000000 HC ANESTHESIA ATTENDED CARE: Performed by: INTERNAL MEDICINE

## 2020-08-14 PROCEDURE — 96366 THER/PROPH/DIAG IV INF ADDON: CPT

## 2020-08-14 PROCEDURE — 85027 COMPLETE CBC AUTOMATED: CPT

## 2020-08-14 PROCEDURE — C9113 INJ PANTOPRAZOLE SODIUM, VIA: HCPCS | Performed by: INTERNAL MEDICINE

## 2020-08-14 PROCEDURE — 7100000000 HC PACU RECOVERY - FIRST 15 MIN: Performed by: INTERNAL MEDICINE

## 2020-08-14 PROCEDURE — 7100000001 HC PACU RECOVERY - ADDTL 15 MIN: Performed by: INTERNAL MEDICINE

## 2020-08-14 PROCEDURE — 2500000003 HC RX 250 WO HCPCS: Performed by: NURSE ANESTHETIST, CERTIFIED REGISTERED

## 2020-08-14 PROCEDURE — 99225 PR SBSQ OBSERVATION CARE/DAY 25 MINUTES: CPT | Performed by: INTERNAL MEDICINE

## 2020-08-14 PROCEDURE — APPSS45 APP SPLIT SHARED TIME 31-45 MINUTES: Performed by: NURSE PRACTITIONER

## 2020-08-14 PROCEDURE — 6360000002 HC RX W HCPCS: Performed by: INTERNAL MEDICINE

## 2020-08-14 PROCEDURE — 6370000000 HC RX 637 (ALT 250 FOR IP): Performed by: NURSE PRACTITIONER

## 2020-08-14 RX ORDER — HYDRALAZINE HYDROCHLORIDE 20 MG/ML
5 INJECTION INTRAMUSCULAR; INTRAVENOUS EVERY 10 MIN PRN
Status: DISCONTINUED | OUTPATIENT
Start: 2020-08-14 | End: 2020-08-14 | Stop reason: HOSPADM

## 2020-08-14 RX ORDER — LIDOCAINE HYDROCHLORIDE 10 MG/ML
INJECTION, SOLUTION EPIDURAL; INFILTRATION; INTRACAUDAL; PERINEURAL PRN
Status: DISCONTINUED | OUTPATIENT
Start: 2020-08-14 | End: 2020-08-14 | Stop reason: SDUPTHER

## 2020-08-14 RX ORDER — PROPOFOL 10 MG/ML
INJECTION, EMULSION INTRAVENOUS PRN
Status: DISCONTINUED | OUTPATIENT
Start: 2020-08-14 | End: 2020-08-14 | Stop reason: SDUPTHER

## 2020-08-14 RX ORDER — MEPERIDINE HYDROCHLORIDE 50 MG/ML
12.5 INJECTION INTRAMUSCULAR; INTRAVENOUS; SUBCUTANEOUS EVERY 5 MIN PRN
Status: DISCONTINUED | OUTPATIENT
Start: 2020-08-14 | End: 2020-08-14 | Stop reason: HOSPADM

## 2020-08-14 RX ORDER — CALCIUM CARBONATE 200(500)MG
1000 TABLET,CHEWABLE ORAL ONCE
Status: DISCONTINUED | OUTPATIENT
Start: 2020-08-14 | End: 2020-08-14 | Stop reason: HOSPADM

## 2020-08-14 RX ORDER — SODIUM CHLORIDE 9 MG/ML
INJECTION, SOLUTION INTRAVENOUS CONTINUOUS PRN
Status: DISCONTINUED | OUTPATIENT
Start: 2020-08-14 | End: 2020-08-14 | Stop reason: SDUPTHER

## 2020-08-14 RX ORDER — FENTANYL CITRATE 50 UG/ML
25 INJECTION, SOLUTION INTRAMUSCULAR; INTRAVENOUS EVERY 5 MIN PRN
Status: DISCONTINUED | OUTPATIENT
Start: 2020-08-14 | End: 2020-08-14 | Stop reason: HOSPADM

## 2020-08-14 RX ORDER — ONDANSETRON 4 MG/1
4 TABLET, FILM COATED ORAL 3 TIMES DAILY PRN
Qty: 15 TABLET | Refills: 0 | Status: SHIPPED | OUTPATIENT
Start: 2020-08-14

## 2020-08-14 RX ORDER — HYDROCODONE BITARTRATE AND ACETAMINOPHEN 5; 325 MG/1; MG/1
2 TABLET ORAL PRN
Status: DISCONTINUED | OUTPATIENT
Start: 2020-08-14 | End: 2020-08-14 | Stop reason: HOSPADM

## 2020-08-14 RX ORDER — PANTOPRAZOLE SODIUM 40 MG/1
40 TABLET, DELAYED RELEASE ORAL
Qty: 30 TABLET | Refills: 1 | Status: SHIPPED | OUTPATIENT
Start: 2020-08-14

## 2020-08-14 RX ORDER — PROMETHAZINE HYDROCHLORIDE 25 MG/ML
6.25 INJECTION, SOLUTION INTRAMUSCULAR; INTRAVENOUS
Status: DISCONTINUED | OUTPATIENT
Start: 2020-08-14 | End: 2020-08-14 | Stop reason: HOSPADM

## 2020-08-14 RX ORDER — CEFUROXIME AXETIL 250 MG/1
250 TABLET ORAL 2 TIMES DAILY
Qty: 10 TABLET | Refills: 0 | Status: SHIPPED | OUTPATIENT
Start: 2020-08-14 | End: 2020-08-19

## 2020-08-14 RX ORDER — POTASSIUM CHLORIDE 7.45 MG/ML
10 INJECTION INTRAVENOUS
Status: DISPENSED | OUTPATIENT
Start: 2020-08-14 | End: 2020-08-14

## 2020-08-14 RX ORDER — DULAGLUTIDE 1.5 MG/.5ML
1.5 INJECTION, SOLUTION SUBCUTANEOUS WEEKLY
Qty: 2.5 ML | Refills: 0 | Status: SHIPPED | OUTPATIENT
Start: 2020-08-21 | End: 2020-09-19

## 2020-08-14 RX ORDER — HYDROCODONE BITARTRATE AND ACETAMINOPHEN 5; 325 MG/1; MG/1
1 TABLET ORAL PRN
Status: DISCONTINUED | OUTPATIENT
Start: 2020-08-14 | End: 2020-08-14 | Stop reason: HOSPADM

## 2020-08-14 RX ORDER — MORPHINE SULFATE 1 MG/ML
1 INJECTION, SOLUTION EPIDURAL; INTRATHECAL; INTRAVENOUS EVERY 5 MIN PRN
Status: DISCONTINUED | OUTPATIENT
Start: 2020-08-14 | End: 2020-08-14 | Stop reason: HOSPADM

## 2020-08-14 RX ORDER — DIPHENHYDRAMINE HYDROCHLORIDE 50 MG/ML
12.5 INJECTION INTRAMUSCULAR; INTRAVENOUS
Status: DISCONTINUED | OUTPATIENT
Start: 2020-08-14 | End: 2020-08-14 | Stop reason: HOSPADM

## 2020-08-14 RX ORDER — ONDANSETRON 2 MG/ML
4 INJECTION INTRAMUSCULAR; INTRAVENOUS
Status: DISCONTINUED | OUTPATIENT
Start: 2020-08-14 | End: 2020-08-14 | Stop reason: HOSPADM

## 2020-08-14 RX ADMIN — LIDOCAINE HYDROCHLORIDE 50 MG: 10 INJECTION, SOLUTION EPIDURAL; INFILTRATION; INTRACAUDAL; PERINEURAL at 14:54

## 2020-08-14 RX ADMIN — PANTOPRAZOLE SODIUM 40 MG: 40 INJECTION, POWDER, FOR SOLUTION INTRAVENOUS at 10:19

## 2020-08-14 RX ADMIN — PROPOFOL 20 MG: 10 INJECTION, EMULSION INTRAVENOUS at 15:00

## 2020-08-14 RX ADMIN — SODIUM CHLORIDE: 9 INJECTION, SOLUTION INTRAVENOUS at 14:53

## 2020-08-14 RX ADMIN — PROPOFOL 20 MG: 10 INJECTION, EMULSION INTRAVENOUS at 15:03

## 2020-08-14 RX ADMIN — Medication 10 ML: at 10:19

## 2020-08-14 RX ADMIN — POTASSIUM CHLORIDE 10 MEQ: 7.46 INJECTION, SOLUTION INTRAVENOUS at 12:18

## 2020-08-14 RX ADMIN — PROPOFOL 20 MG: 10 INJECTION, EMULSION INTRAVENOUS at 14:57

## 2020-08-14 RX ADMIN — PROPOFOL 80 MG: 10 INJECTION, EMULSION INTRAVENOUS at 14:55

## 2020-08-14 RX ADMIN — POTASSIUM BICARBONATE 40 MEQ: 782 TABLET, EFFERVESCENT ORAL at 16:30

## 2020-08-14 RX ADMIN — PROPOFOL 20 MG: 10 INJECTION, EMULSION INTRAVENOUS at 15:06

## 2020-08-14 RX ADMIN — CEFTRIAXONE SODIUM 1 G: 1 INJECTION, POWDER, FOR SOLUTION INTRAMUSCULAR; INTRAVENOUS at 13:41

## 2020-08-14 ASSESSMENT — ENCOUNTER SYMPTOMS
CHEST TIGHTNESS: 0
BLOOD IN STOOL: 0
WHEEZING: 0
ABDOMINAL PAIN: 0
NAUSEA: 0
VOMITING: 0
SHORTNESS OF BREATH: 0
CONSTIPATION: 0
COUGH: 0
DIARRHEA: 0

## 2020-08-14 ASSESSMENT — PAIN SCALES - GENERAL
PAINLEVEL_OUTOF10: 0

## 2020-08-14 ASSESSMENT — PULMONARY FUNCTION TESTS
PIF_VALUE: 1
PIF_VALUE: 2
PIF_VALUE: 1

## 2020-08-14 ASSESSMENT — PAIN - FUNCTIONAL ASSESSMENT: PAIN_FUNCTIONAL_ASSESSMENT: 0-10

## 2020-08-14 NOTE — PLAN OF CARE
Nutrition Problem #1: Inadequate oral intake  Intervention: Food and/or Nutrient Delivery: Continue NPO  Nutritional Goals: PO to meet >75% of needs

## 2020-08-14 NOTE — ANESTHESIA PRE PROCEDURE
Department of Anesthesiology  Preprocedure Note       Name:  Dao Thompson   Age:  63 y.o.  :  1960                                          MRN:  [id number]         Date:  2020      Surgeon: Justen Kidd):  Ashish Madera MD    Procedure: Procedure(s):  EGD ESOPHAGOGASTRODUODENOSCOPY    Medications prior to admission:   Prior to Admission medications    Medication Sig Start Date End Date Taking?  Authorizing Provider   metFORMIN (GLUCOPHAGE) 500 MG tablet Take 500 mg by mouth 2 times daily (with meals)   Yes Historical Provider, MD   atorvastatin (LIPITOR) 20 MG tablet Take 20 mg by mouth daily   Yes Historical Provider, MD   Dulaglutide (TRULICITY) 1.5 KF/1.5WI SOPN Inject 1.5 mg into the skin once a week    Historical Provider, MD       Current medications:    Current Facility-Administered Medications   Medication Dose Route Frequency Provider Last Rate Last Dose    calcium carbonate (TUMS) chewable tablet 1,000 mg  1,000 mg Oral Once Jaynee Height, DO   Stopped at 20 0801    potassium chloride 10 mEq/100 mL IVPB (Peripheral Line)  10 mEq Intravenous Q1H Westlandleena Madera APRN -  mL/hr at 20 1218 10 mEq at 20 1218    sodium chloride flush 0.9 % injection 10 mL  10 mL Intravenous 2 times per day Westlandleena Madera APRN - CNP        sodium chloride flush 0.9 % injection 10 mL  10 mL Intravenous PRN Westlandleena Madera, APRN - CNP   10 mL at 20 1019    acetaminophen (TYLENOL) tablet 650 mg  650 mg Oral Q6H PRN Akshat Madera APRN - CNP        Or    acetaminophen (TYLENOL) suppository 650 mg  650 mg Rectal Q6H PRN Akshat Cassy, APRN - CNP        polyethylene glycol (GLYCOLAX) packet 17 g  17 g Oral Daily PRN Akshatleena Madera, APRN - CNP        promethazine (PHENERGAN) tablet 12.5 mg  12.5 mg Oral Q6H PRN Akshatleena Madera, APRN - CNP        Or    ondansetron (ZOFRAN) injection 4 mg  4 mg Intravenous Q6H PRN Westlandleena Madera, APRN - CNP        nicotine (NICODERM CQ) 21 MG/24HR 1 patch 1019    And    sodium chloride (PF) 0.9 % injection 10 mL  10 mL Intravenous BID Karla Tyler MD   10 mL at 08/13/20 2007       Allergies:  No Known Allergies    Problem List:    Patient Active Problem List   Diagnosis Code    Hyperlipidemia E78.5    Diabetes mellitus ([facility] [address] E11.9    Type 2 diabetes mellitus with hyperglycemia (HCC) E11.65    Acute cystitis with hematuria N30.01    Nausea and vomiting R11.2    Idiopathic acute pancreatitis without infection or necrosis K85.00    Adenoma of left adrenal gland D35.02       Past Medical History:        Diagnosis Date    Diabetes mellitus ([facility] [address]     Hyperlipidemia     Normal pressure glaucoma        Past Surgical History:        Procedure Laterality Date    ABDOMEN SURGERY      exp lap    CHOLECYSTECTOMY         Social History:    Social History     Tobacco Use    Smoking status: Never Smoker    Smokeless tobacco: Never Used   Substance Use Topics    Alcohol use: Never     Frequency: Never                                Counseling given: Not Answered      Vital Signs (Current):   Vitals:    08/14/20 0745 08/14/20 1014 08/14/20 1017 08/14/20 1026   BP:  (!) 155/87  (!) 160/103   Pulse:  81     Resp:  18     Temp:  98.1 °F (36.7 °C)     TempSrc:  Oral     SpO2:  94%     Weight: 229 lb 11.5 oz (104.2 kg)      Height:   5' 5\" (1.651 m)                                               BP Readings from Last 3 Encounters:   08/14/20 (!) 160/103       NPO Status:                                                                                 BMI:   Wt Readings from Last 3 Encounters:   08/14/20 229 lb 11.5 oz (104.2 kg)     Body mass index is 38.23 kg/m².     CBC:   Lab Results   Component Value Date    WBC 7.2 08/14/2020    RBC 3.95 08/14/2020    HGB 13.8 08/14/2020    HCT 40.3 08/14/2020    MCV 95.5 08/14/2020    RDW 12.2 08/14/2020     08/14/2020       CMP:   Lab Results   Component Value Date     08/14/2020    K 3.5 08/14/2020     08/14/2020 CO2 26 08/14/2020    BUN 8 08/14/2020    CREATININE 0.55 08/14/2020    GFRAA >60 08/14/2020    LABGLOM >60 08/14/2020    GLUCOSE 122 08/14/2020    PROT 7.4 08/13/2020    CALCIUM 8.4 08/14/2020    BILITOT 0.32 08/13/2020    ALKPHOS 114 08/13/2020    AST 17 08/13/2020    ALT 21 08/13/2020       POC Tests:   Recent Labs     08/14/20  1220   POCGLU 128*       Coags:   Lab Results   Component Value Date    PROTIME 10.1 08/14/2020    INR 1.0 08/14/2020       HCG (If Applicable): No results found for: PREGTESTUR, PREGSERUM, HCG, HCGQUANT     ABGs: No results found for: PHART, PO2ART, TCL4EFE, GJY4JZL, BEART, U9BZEQXY     Type & Screen (If Applicable):  No results found for: LABABO, LABRH    Drug/Infectious Status (If Applicable):  No results found for: HIV, HEPCAB    COVID-19 Screening (If Applicable):   Lab Results   Component Value Date    COVID19 Not Detected 08/13/2020         Anesthesia Evaluation  Patient summary reviewed and Nursing notes reviewed no history of anesthetic complications:   Airway: Mallampati: II  TM distance: >3 FB   Neck ROM: full  Mouth opening: > = 3 FB Dental:          Pulmonary:Negative Pulmonary ROS and normal exam  breath sounds clear to auscultation                             Cardiovascular:  Exercise tolerance: no interval change,   (+) hyperlipidemia        Rhythm: regular  Rate: normal           Beta Blocker:  Not on Beta Blocker         Neuro/Psych:   Negative Neuro/Psych ROS              GI/Hepatic/Renal:            ROS comment: Obesity, NAUSEA AND VOMITTING BLOOD . Endo/Other:    (+) DiabetesType II DM, no interval change, , .                 Abdominal:   (+) obese,         Vascular: negative vascular ROS. Anesthesia Plan      MAC and general     ASA 2             Anesthetic plan and risks discussed with patient. Plan discussed with CRNA.                   Goyo Russell MD   8/14/2020

## 2020-08-14 NOTE — CARE COORDINATION
Case Management Initial Discharge Plan  [facility],             Met with:patient to discuss discharge plans. Information verified: address, contacts, phone number, , insurance Yes    Emergency Contact/Next of Kin name & number: spouse Nirmal  - [phone]    PCP: No primary care provider on file. Date of last visit: lives in [place] - has been recently without a PCP. Will be looking for one when returns home    Insurance Provider: Ameya Herrera    Discharge Planning    Living Arrangements:      Support Systems:       Patient able to perform ADL's:Independent    Current Services (outpatient & in home) none  DME equipment: glucometer  DME provider:     Receiving oral anticoagulation therapy? No    If indicated:   Physician managing anticoagulation treatment:   Where does patient obtain lab work for ATC treatment? Potential Assistance Needed:       Patient agreeable to home care: No  [place] of choice provided:  no    Prior SNF/Rehab Placement and Facility: no  Agreeable to SNF/Rehab: No  [place] of choice provided: no     Evaluation: no    Expected Discharge date:       Patient expects to be discharged to: Follow Up Appointment: Best Day/ Time:      Transportation provider: self  Transportation arrangements needed for discharge: No    Readmission Risk              Risk of Unplanned Readmission:        0             Does patient have a readmission risk score greater than 14?: Yes  If yes, follow-up appointment must be made within 7 days of discharge. Goals of Care:       Discharge Plan: home, independently - lives in [address] [place] [address] her mother in law. Plans on returning to [place] for all follow-ups.   EGD today @ 1070        Electronically signed by Avis Pace RN on 20 at 10:33 AM EDT

## 2020-08-14 NOTE — PLAN OF CARE
Problem: Infection:  Goal: Will remain free from infection  Description: Will remain free from infection  8/14/2020 0415 by Michelle Rogers RN  Outcome: Ongoing   Patient vitals wdl this shift. Will continue to monitor. Problem: Safety:  Goal: Free from accidental physical injury  Description: Free from accidental physical injury  8/14/2020 0415 by Michelle Rogers RN  Outcome: Ongoing   Room free from clutter and adequate lighting provided. Will continue to monitor. Problem: Daily Care:  Goal: Daily care needs are met  Description: Daily care needs are met  8/14/2020 0415 by Michelle Rogers RN  Outcome: Ongoing   Patient able to make needs known    Problem: Pain:  Goal: Patient's pain/discomfort is manageable  Description: Patient's pain/discomfort is manageable  8/14/2020 0415 by Michelle Rogers RN  Outcome: Ongoing   No c/o pain this shift. Problem: Skin Integrity:  Goal: Skin integrity will stabilize  Description: Skin integrity will stabilize  8/14/2020 0415 by Michelle Rogers RN  Outcome: Ongoing   Patient turns self and hob self regulated.

## 2020-08-14 NOTE — ANESTHESIA POSTPROCEDURE EVALUATION
POST- ANESTHESIA EVALUATION       Pt Name: Rema Souza  MRN: [id number]  [place]: 1960  Date of evaluation: 8/14/2020  Time:  3:51 PM      BP (!) 145/80   Pulse 80   Temp 97.8 °F (36.6 °C) (Temporal)   Resp 16   Ht 5' 5\" (1.651 m)   Wt 229 lb 11.5 oz (104.2 kg)   SpO2 96%   BMI 38.23 kg/m²      Consciousness Level  Awake  Cardiopulmonary Status  Stable  Pain Adequately Treated YES  Nausea / Vomiting  NO  Adequate Hydration  YES  Anesthesia Related Complications NONE      Electronically signed by Karolina Sullivan MD on 8/14/2020 at 3:51 PM       Department of Anesthesiology  Postprocedure Note    Patient: Rema Souza  MRN: [id number]  [place]: 1960  Date of evaluation: 8/14/2020  Time:  3:51 PM     Procedure Summary     Date:  08/14/20 Room / Location:  [address]    Anesthesia Start:  2171 Anesthesia Stop:  9741    Procedure:  EGD BIOPSY (N/A ) Diagnosis:  (NAUSEA AND VOMITTING BLOOD)    Surgeon:  Brittany Suarez MD Responsible Provider:  JAMES Jasso CRNA    Anesthesia Type:  MAC, general ASA Status:  2          Anesthesia Type: MAC, general    Yvonne Phase I: Yvonne Score: 10    Yvonne Phase II:      Last vitals: Reviewed and per EMR flowsheets.        Anesthesia Post Evaluation

## 2020-08-14 NOTE — CONSULTS
Gastroenterology Consult Note      Patient: Mercedes Schlatter  : 1960  Acct#:  [de-identified]     Date:  2020    Subjective:       History of Present Illness  Patient is a 63 y.o.  female admitted with Type 2 diabetes mellitus with [facility] ([facility] [E11.65] who is seen in consult for nausea vomiting and diarrhea  Patient presented to the emergency room with abdominal pain  Nausea and vomiting  And some diarrhea  All of the symptoms are better right now  She does report some streaks of blood with her vomiting  She started mainly with the vomiting but after vomited couple 3 times to start seeing some streaks of blood  She denied any melena or blood in the stool  There is some concern about some blood in the urine and some UTI. Denied any odynophagia dysphagia  Denied any significant heartburn at this time  Denied any significant weight loss  Her labs including CBC which was all normal including hemoglobin  Liver enzymes were all normal except slight elevation of the alkaline phosphatase  Lipase was 79  No CT scan finding of any pancreatitis  CT scan was otherwise all negative also      Past Medical History:   Diagnosis Date    Diabetes mellitus ([facility]     Hyperlipidemia     Normal pressure glaucoma       Past Surgical History:   Procedure Laterality Date    ABDOMEN SURGERY      CHOLECYSTECTOMY        Past Endoscopic History none     Admission Meds  No current facility-administered medications on file prior to encounter.       Current Outpatient Medications on File Prior to Encounter   Medication Sig Dispense Refill    metFORMIN (GLUCOPHAGE) 500 MG tablet Take 500 mg by mouth 2 times daily (with meals)      atorvastatin (LIPITOR) 20 MG tablet Take 20 mg by mouth daily      Dulaglutide (TRULICITY) 1.5 LX/3.8VI SOPN Inject 1.5 mg into the skin once a week         Patient   Does Use ASA, NSAID No  Allergies  No Known Allergies     Social   Social History     Tobacco Use    Smoking status: Never Smoker    Smokeless tobacco: Never Used   Substance Use Topics    Alcohol use: Never     Frequency: Never        PSYCH HISTORY:  Depression No  Anxiety No  Suicide No       Family History   Problem Relation Age of Onset   Marsha Nayak Glaucoma Mother     Hypertension Father     Stroke Father     Dementia Father     No Known Problems Sister     Diabetes Brother     Hypertension Brother     Other Other         frequent kidney stones      No family history of colon cancer, Crohn's disease, or ulcerative colitis. Review of Systems  Constitutional: negative  Eyes: negative  Ears, nose, mouth, throat, and face: negative  Respiratory: negative  Cardiovascular: negative  Gastrointestinal: negative  Genitourinary:negative  Integument/breast: negative  Hematologic/lymphatic: negative  Musculoskeletal:negative  Endocrine: negative           Physical Exam  Blood pressure 123/71, pulse 80, temperature 97.4 °F (36.3 °C), temperature source Oral, resp. rate 18, height 5' 5\" (1.651 m), weight 215 lb (97.5 kg), SpO2 94 %.          General Appearance: alert and oriented to person, place and time, well-developed and well-nourished, in no acute distress  Skin: warm and dry, no rash or erythema  Head: normocephalic and atraumatic  Eyes: pupils equal, round, and reactive to light, extraocular eye movements intact, conjunctivae normal  ENT: hearing grossly normal bilaterally  Neck: neck supple and non tender without mass, no thyromegaly or thyroid nodules, no cervical lymphadenopathy   Pulmonary/Chest: clear to auscultation bilaterally- no wheezes, rales or rhonchi, normal air movement, no respiratory distress  Cardiovascular: normal rate, regular rhythm, normal S1 and S2, no murmurs, rubs, clicks or gallops, distal pulses intact, no carotid bruits  Abdomen: soft, non-tender, non-distended, normal bowel sounds, no masses or organomegaly  Extremities: no cyanosis, clubbing or edema  Musculoskeletal: normal range of motion,

## 2020-08-14 NOTE — OP NOTE
PROCEDURE NOTE    DATE OF PROCEDURE: 8/14/2020     SURGEON: Bobo Wetzel MD  Facility: [facility]   ASSISTANT: None  Anesthesia: MAC  PREOPERATIVE DIAGNOSIS:   Nausea and vomiting  Some hematemesis    Diagnosis:  Retained liquid in the stomach indicating little bit of delayed emptying  Not very significant    Gastritis biopsies were taken from the antrum    Multiple gastric polyps look like fundic gland polyps we sampled 2 of them with a cold biopsy        POSTOPERATIVE DIAGNOSIS: As described below    OPERATION: Upper GI endoscopy with Biopsy    ANESTHESIA: Moderate Sedation     ESTIMATED BLOOD LOSS: Less than 50 ml    COMPLICATIONS: None. SPECIMENS:  Was Obtained:   As above     HISTORY: The patient is a 63y.o. year old female with history of above preop diagnosis. I recommended esophagogastroduodenoscopy with possible biopsy and I explained the risk, benefits, expected outcome, and alternatives to the procedure. Risks included but are not limited to bleeding, infection, respiratory distress, hypotension, and perforation of the esophagus, stomach, or duodenum. Patient understands and is in agreement. The patient was counseled at length about the risks of kiara Covid-19 during their perioperative period and any recovery window from their procedure. The patient was made aware that kiara Covid-19  may worsen their prognosis for recovering from their procedure  and lend to a higher morbidity and/or mortality risk. All material risks, benefits, and reasonable alternatives including postponing the procedure were discussed. The patient does wish to proceed with the procedure at this time. PROCEDURE: The patient was given IV conscious sedation. The patient's SPO2 remained above 90% throughout the procedure. The gastroscope was inserted orally and advanced under direct vision through the esophagus, through the stomach, through the pylorus, and into the descending duodenum.       Post sedation note : The patient's SPO2 remained above 90% throughout the procedure. the vital signs remained stable , and no immediate complication form the procedure noted, patient will be ready for d/c when criteria is met . Findings:    Retropharyngeal area was grossly normal appearing    Esophagus: normal    Stomach:  Retained liquid in the stomach indicating little bit of delayed emptying  Not very significant    Gastritis biopsies were taken from the antrum    Multiple gastric polyps look like fundic gland polyps we sampled 2 of them with a cold biopsy    Duodenum:     Descending: normal    Bulb: normal    The scope was removed and the patient tolerated the procedure well. Recommendations/Plan:   1. Advance diet  2. Continue PPI and Zofran  3. Okay to discharge to follow as an outpatient  4. She would need a colonoscopy as an outpatient  5. F/U In Office in 3-4 weeks  6.  Discussed with the family    Electronically signed by Georgie Horton MD  on 8/14/2020 at 3:12 PM

## 2020-08-14 NOTE — DISCHARGE SUMMARY
[facility]    Discharge Summary     Patient ID: Nate Eden  :  1960   MRN: [id number]     ACCOUNT:  [de-identified]   Patient's PCP: No primary care provider on file. Admit Date: 2020   Discharge Date: 2020   Length of Stay: 0  Code Status:  Full Code  Admitting Physician: Justin Ta DO  Discharge Physician: Yaritza Resendez APRN - CNP     Active Discharge Diagnoses:     Hospital Problem Lists:  Principal Problem:    Type 2 diabetes mellitus with hyperglycemia ([facility]  Active Problems:    Hyperlipidemia    Adenoma of left adrenal gland    Gastritis    Gastric polyps  Resolved Problems:    Acute cystitis with hematuria    Nausea and vomiting    Idiopathic acute pancreatitis without infection or necrosis      Admission Condition:  fair     Discharged Condition: good    Hospital Stay:     Hospital Course:  Radha George is a 63 y.o. Non-/non  female who presents with Nausea and Emesis   and is admitted to the hospital for the management of Type 2 diabetes mellitus with hyperglycemia ([facility]      Patient presented to the emergency room with complaints of right lower back pain, nausea, vomiting and diarrhea that started approximately 4:00 this morning.  She described her right lower back pain as sharp and stabbing, she said her nausea was initially undigested food but then became bloody.  She describes her diarrhea as soft and brown.  She also reports urinary frequency and blood when she wiped after voiding earlier today.  She denies fever, chills, chest pain or shortness of breath.  The patient is a diabetic and normally takes Glucophage twice a day and Trulicity but has not had her Trulicity since April.  She states she came back to this area from [place] to take care of her ailing mother-in-law because of insurance issues and labs she ran out.  She states she has been taking the Glucophage but mostly once a day.  She states on average when she checks her blood sugar its between 200-260. Her only other history is hyperlipidemia. Eugene Alejandre does state that she is been told in the past she may be lactose intolerant.  She states she had an EGD/colonoscopy in her 25s and was told she had gallstones at that time.     On arrival to the ER vitals are stable.  CT of the abdomen and pelvis without contrast shows no evidence of acute intra-abdominal or intrapelvic pathology.  BUN and creatinine within normal limits blood glucose was 382.  She was given 8 units of regular insulin and her sugar came down to 252.  Amylase 59 lipase 79.  CBC unremarkable.  COVID-19 swab negative.  Urine is positive for 3+ glucose, 20-50 RBCs, few bacteria and a large amount hemoglobin.  EKG shows normal sinus rhythm with PACs, left axis deviation, and a left bundle branch block. GI consult for reports of hematemesis     EGD unremarkable. Gi ok with discharge. Continue Zofran and PPI    Significant therapeutic interventions:  EGD. Ceftin for UTI, PPI and Zofran.      Significant Diagnostic Studies:   Labs / Micro:  CBC:   Lab Results   Component Value Date    WBC 7.2 2020    RBC 3.95 2020    HGB 13.8 2020    HCT 40.3 2020    MCV 95.5 2020    MCH 33.0 2020    MCHC 34.5 2020    RDW 12.2 2020     2020     BMP:    Lab Results   Component Value Date    GLUCOSE 122 2020     2020    K 3.5 2020     2020    CO2 26 2020    ANIONGAP 9 2020    BUN 8 2020    CREATININE 0.55 2020    BUNCRER NOT REPORTED 2020    CALCIUM 8.4 2020    LABGLOM >60 2020    GFRAA >60 2020    GFR      2020    GFR NOT REPORTED 2020     HFP:    Lab Results   Component Value Date    PROT 7.4 2020     CMP:    Lab Results   Component Value Date    GLUCOSE 122 2020     2020    K 3.5 2020     2020    CO2 26 2020 BUN 8 08/14/2020    CREATININE 0.55 08/14/2020    ANIONGAP 9 08/14/2020    ALKPHOS 114 08/13/2020    ALT 21 08/13/2020    AST 17 08/13/2020    BILITOT 0.32 08/13/2020    LABALBU 4.2 08/13/2020    ALBUMIN 1.3 08/13/2020    LABGLOM >60 08/14/2020    GFRAA >60 08/14/2020    GFR      08/14/2020    GFR NOT REPORTED 08/14/2020    PROT 7.4 08/13/2020    CALCIUM 8.4 08/14/2020     Results for Filomena Acuña (MRN [id number]) as of 8/14/2020 17:48   Ref. Range 8/13/2020 10:00   Color, UA Latest Ref Range: YELLOW  YELLOW   Turbidity UA Latest Ref Range: CLEAR  CLEAR   Glucose, UA Latest Ref Range: NEGATIVE  3+ (A)   Bilirubin, Urine Latest Ref Range: NEGATIVE  NEGATIVE   Ketones, Urine Latest Ref Range: NEGATIVE  NEGATIVE   Specific Gravity, UA Latest Ref Range: 1.005 - 1.030  1.015   pH, UA Latest Ref Range: 5.0 - 8.0  7.0   Protein, UA Latest Ref Range: NEGATIVE  NEGATIVE   Urobilinogen, Urine Latest Ref Range: Normal  Normal   Nitrite, Urine Latest Ref Range: NEGATIVE  NEGATIVE   Leukocyte Esterase, Urine Latest Ref Range: NEGATIVE  NEGATIVE   Urinalysis Comments Unknown NOT REPORTED   Casts UA Latest Units: /LPF NOT REPORTED   Mucus, UA Latest Ref Range: None  NOT REPORTED   WBC, UA Latest Ref Range: 0 - 5 /HPF 0 TO 2   RBC, UA Latest Ref Range: 0 - 2 /HPF 20 TO 50   Epithelial Cells, UA Latest Ref Range: 0 - 5 /HPF 0 TO 2   Renal Epithelial, UA Latest Ref Range: 0 /HPF NOT REPORTED   Bacteria, UA Latest Ref Range: None  FEW (A)   Amorphous, UA Latest Ref Range: None  NOT REPORTED   Yeast, Urine Latest Ref Range: None  NOT REPORTED   Crystals, UA Latest Ref Range: None /HPF NOT REPORTED   Urine Hgb Latest Ref Range: NEGATIVE  LARGE (A)   Trichomonas, UA Latest Ref Range: None  NOT REPORTED   Other Observations UA Latest Ref Range: NOT REQ. Utilizing a urinalysis as the only screening method to exclude a potential uropathogen can be unr. .. (A)       Radiology:  Ct Abdomen Pelvis Wo Contrast Additional Contrast? None    Result Date: 8/13/2020  No evidence for acute intra-abdominal or intrapelvic pathology. No bowel obstruction or inflammation. No evidence for nephrolithiasis or urinary obstruction. Normal appendix. Left adrenal adenoma. Consultations:    Consults:     Final Specialist Recommendations/Findings:   IP CONSULT TO GI      The patient was seen and examined on day of discharge and this discharge summary is in conjunction with any daily progress note from day of discharge. Discharge plan:     Disposition: Home    Physician Follow Up:     Patient to establish with a PCP soon and to follow up with her normal endocrinologist.     Diet: diabetic diet and low fat, low cholesterol diet    Activity: As tolerated    Instructions to Patient: take medications as prescribed. Call 911 or return to the ER if you experience a recurrence of your symptoms or start having fever, chills, chest pain or shortness of breath.       Discharge Medications:      Medication List      START taking these medications    cefUROXime 250 MG tablet  Commonly known as:  Ceftin  Take 1 tablet by mouth 2 times daily for 5 days     ondansetron 4 MG tablet  Commonly known as:  ZOFRAN  Take 1 tablet by mouth 3 times daily as needed for Nausea or Vomiting     pantoprazole 40 MG tablet  Commonly known as:  PROTONIX  Take 1 tablet by mouth every morning (before breakfast)        CONTINUE taking these medications    atorvastatin 20 MG tablet  Commonly known as:  LIPITOR     metFORMIN 500 MG tablet  Commonly known as:  GLUCOPHAGE     Trulicity 1.5 DF/5.7EW Sopn  Generic drug:  Dulaglutide  Inject 1.5 mg into the skin once a week for 5 doses  Start taking on:  August 21, 2020           Where to Get Your Medications      These medications were sent to [address] [place] [address] [address], [address] [address]    Phone:  [phone]   cefUROXime 250 MG tablet  ondansetron 4 MG tablet  pantoprazole 40 MG tablet  Trulicity 1.5 GI/9.8JA Sopn         No discharge procedures on file. Time Spent on discharge is  31 mins in patient examination, evaluation, counseling as well as medication reconciliation, prescriptions for required medications, discharge plan and follow up. Electronically signed by   JAMES Chambers CNP  8/14/2020  5:50 PM      Thank you Dr. Denver Kiang primary care provider on file. for the opportunity to be involved in this patient's care.

## 2020-08-14 NOTE — PROGRESS NOTES
[facility]    Progress Note    8/14/2020    6:21 PM    Name:   Precious Young  MRN:     [id number]     Acct:      [de-identified]   Room:   320/[facility]  IP Day:  0  Admit Date:  8/13/2020  9:15 AM    PCP:   No primary care provider on file. Code Status:  Full Code    Subjective:     C/C:   Chief Complaint   Patient presents with    Nausea    Emesis     Interval History Status: significantly improved. The patient denies any complaints whatsoever. She states her diarrhea has completely resolved. No more pain, nausea or vomiting. She is been n.p.o. overnight for an EGD later today    Brief History:     Precious Young is a 63 y.o. Non-/non  female who presents with Nausea and Emesis   and is admitted to the hospital for the management of Type 2 diabetes mellitus with hyperglycemia ([facility] [address] [address]    Patient presented to the emergency room with complaints of right lower back pain, nausea, vomiting and diarrhea that started approximately 4:00 this morning. She described her right lower back pain as sharp and stabbing, she said her nausea was initially undigested food but then became bloody. She describes her diarrhea as soft and brown. She also reports urinary frequency and blood when she wiped after voiding earlier today. She denies fever, chills, chest pain or shortness of breath. The patient is a diabetic and normally takes Glucophage twice a day and Trulicity but has not had her Trulicity since April. She states she came back to this area from [place] to take care of her ailing mother-in-law because of insurance issues and labs she ran out. She states she has been taking the Glucophage but mostly once a day. She states on average when she checks her blood sugar its between 200-260. Her only other history is hyperlipidemia. She does state that she is been told in the past she may be lactose intolerant.   She states she had an EGD/colonoscopy Subcutaneous Nightly    [Held by provider] metFORMIN  500 mg Oral BID WC    atorvastatin  20 mg Oral Daily    Dulaglutide  1.5 mg Subcutaneous Weekly    pantoprazole  40 mg Intravenous BID    And    sodium chloride (PF)  10 mL Intravenous BID     Continuous Infusions:    dextrose       PRN Meds: morphine, HYDROmorphone, fentanNYL, HYDROcodone 5 mg - acetaminophen **OR** HYDROcodone 5 mg - acetaminophen, diphenhydrAMINE, ondansetron, promethazine, hydrALAZINE, meperidine, sodium chloride flush, acetaminophen **OR** acetaminophen, polyethylene glycol, promethazine **OR** ondansetron, nicotine, magnesium sulfate, potassium chloride **OR** potassium alternative oral replacement **OR** potassium chloride, glucose, dextrose, glucagon (rDNA), dextrose, morphine    Data:     Past Medical History:   has a past medical history of Diabetes mellitus ([address] Hyperlipidemia, and Normal pressure glaucoma. Social History:   reports that she has never smoked. She has never used smokeless tobacco. She reports that she does not drink alcohol or use drugs. Family History:   Family History   Problem Relation Age of Onset   Becka Serbian Glaucoma Mother     Hypertension Father     Stroke Father     Dementia Father     No Known Problems Sister     Diabetes Brother     Hypertension Brother     Other Other         frequent kidney stones       Vitals:  BP (!) 172/92   Pulse 79   Temp 98.2 °F (36.8 °C) (Oral)   Resp 16   Ht 5' 5\" (1.651 m)   Wt 229 lb 11.5 oz (104.2 kg)   SpO2 97%   BMI 38.23 kg/m²   Temp (24hrs), Av.9 °F (36.6 °C), Min:97.4 °F (36.3 °C), Max:98.2 °F (36.8 °C)    Recent Labs     20  1559 20  2006 20  1220 20  1526   POCGLU 119* 91 128* 127*       I/O (24Hr):     Intake/Output Summary (Last 24 hours) at 2020 1821  Last data filed at 2020 1556  Gross per 24 hour   Intake 2357.88 ml   Output 400 ml   Net 1957.88 ml       Labs:  Hematology:  Recent Labs     20  1000 08/13/20  2353 08/14/20  0544 08/14/20  1141   WBC 7.9  --   --  7.2  --    RBC 4.51  --   --  3.95*  --    HGB 14.6   < > 13.3 13.0 13.8   HCT 42.9   < > 38.6 37.8 40.3   MCV 95.1  --   --  95.5  --    MCH 32.5  --   --  33.0  --    MCHC 34.1  --   --  34.5  --    RDW 12.3*  --   --  12.2*  --      --   --  188  --    MPV 8.0  --   --  8.0  --    INR  --   --   --  1.0  --     < > = values in this interval not displayed. Chemistry:  Recent Labs     08/13/20 1000 08/14/20  0544    144   K 4.2 3.5*    109*   CO2 27 26   GLUCOSE 382* 122*   BUN 14 8   CREATININE 0.66 0.55   MG  --  2.0   ANIONGAP 11 9   LABGLOM >60 >60   GFRAA >60 >60   CALCIUM 9.2 8.4*   TROPHS 15*  --      Recent Labs     08/13/20 1000 08/13/20  1328 08/13/20  1559 08/13/20 2006 08/14/20  0544 08/14/20  1220 08/14/20  1526   PROT 7.4  --   --   --   --   --   --    LABALBU 4.2  --   --   --   --   --   --    LABA1C 8.4*  --   --   --   --   --   --    AST 17  --   --   --   --   --   --    ALT 21  --   --   --   --   --   --    ALKPHOS 114*  --   --   --   --   --   --    BILITOT 0.32  --   --   --   --   --   --    BILIDIR 0.12  --   --   --   --   --   --    AMYLASE 59  --   --   --   --   --   --    LIPASE 79*  --   --   --  14  --   --    POCGLU  --  252* 119* 91  --  128* 127*     ABG:No results found for: POCPH, PHART, PH, POCPCO2, GAR1VPN, PCO2, POCPO2, PO2ART, PO2, POCHCO3, AAG9BYR, HCO3, NBEA, PBEA, BEART, BE, THGBART, THB, RPX3NFX, VGJA7ORP, K5TBNCWH, O2SAT, FIO2  No results found for: SPECIAL  No results found for: CULTURE    Radiology:  Ct Abdomen Pelvis Wo Contrast Additional Contrast? None    Result Date: 8/13/2020  No evidence for acute intra-abdominal or intrapelvic pathology. No bowel obstruction or inflammation. No evidence for nephrolithiasis or urinary obstruction. Normal appendix. Left adrenal adenoma. Physical Examination:     Physical Exam  Vitals signs and nursing note reviewed. ([facility] 8/13/2020 Yes    Hyperlipidemia 8/13/2020 Yes    Adenoma of left adrenal gland 8/14/2020 Yes    Gastritis 8/14/2020 Yes    Gastric polyps 8/14/2020 Yes          Plan:     Type 2 diabetes with hyperglycemia; resume Trulicity. Add insulin sliding scale for glucose management. Check A1c, gentle IV hydration. Resume Glucophage when nausea and vomiting improves. Clear liquid diet as tolerated     Continue Lipitor related to history of hyperlipidemia     Acute cystitis with hematuria; Rocephin 1 g IV every 24 hours pending urine culture     Lovenox for DVT prophylaxis; hold after complaints of hematemesis and for EGD this morning     Antiemetics and pain medications as needed     Consult GI related to complaint of hematemesis.   Plan for EGD today.     Follow labs     Monitor I&O and telemtry    JAMES Jones CNP  8/14/2020  6:21 PM

## 2020-08-14 NOTE — PROGRESS NOTES
Comprehensive Nutrition Assessment    Type and Reason for Visit:  Initial, Positive Nutrition Screen(N/V)    Nutrition Recommendations/Plan:   1) Follow for testing results and ability to initiate diet. 2) As diet able to be initiated, follow for tolerance and further education needs. 3) Will provide diet education on Diabetes meal planning per pt request.     Nutrition Assessment:  Pt appears well nourished upon admission. Pt presents with N/V, abd pain, and \"chronic bouts of explosive diarrhea for years. \"  States she has not been taking her Trulicity due to insurance issues. Also states that over the past 1-2 months her eating routine has changed, eating more fast foods and different types of meals due to her taking care of her mother in law. Wt has increased from her usual of 212# (stated). Noted plan for EGD today as well as checking for food allergies/celiac panel. Pt states she is interested in diet education to help her get back on track and to help with wt. loss. Medication compliancy encouraged to help with glucose control in addition to diet and lifestyle factors. Will follow for results of testing and ability to advance diet.     Malnutrition Assessment:  Malnutrition Status:  No malnutrition      Estimated Daily Nutrient Needs:  Energy (kcal):  1600kcal/day;  Weight Used for Energy Requirements:  Current     Protein (g):  55g;  Weight Used for Protein Requirements:  Ideal(1g/kg)          Nutrition Related Findings:  hypoactive BS, good glucose control      Wounds:  None       Current Nutrition Therapies:    Diet NPO, After Midnight Exceptions are: Ice Chips    Anthropometric Measures:  · Height: 5' 5\" (165.1 cm)  · Current Body Weight: 229 lb (103.9 kg)   · Admission Body Weight: 229 lb (103.9 kg)    · Usual Body Weight: 212 lb (96.2 kg)(6 months ago per pt)     · Ideal Body Weight: 125 lbs; % Ideal Body Weight 183.2 %   · BMI: 38.1  · Adjusted Body Weight:  ; No Adjustment   · BMI Categories:

## 2020-08-14 NOTE — PROGRESS NOTES
Patient     Feeling well    IV potassium started at slow rate to tolerate    Awaiting EGD this afternoon      NP aware of higher BP will re-evaluate post procedure

## 2020-08-14 NOTE — PROGRESS NOTES
DC instructions reviewed with patient and her sister  All questions answered    All belongings packed including purse, clothing, laptop, cell phone and     Patient felt well enough to ambulate to exit with family    Patient understands she is not to drive until tomorrow due to anesthesia meds.

## 2020-08-18 LAB — SURGICAL PATHOLOGY REPORT: NORMAL

## (undated) DEVICE — Z DISCONTINUED USE 2751540 TUBING IRRIG L10IN DISP PMP ENDOGATOR

## (undated) DEVICE — SYRINGE MED 50ML LUERLOCK TIP

## (undated) DEVICE — BITEBLOCK 54FR W/ DENT RIM BLOX

## (undated) DEVICE — CONNECTOR TBNG AUX H2O JET DISP FOR OLY 160/180 SER

## (undated) DEVICE — TUBING INSUFFLATION CAP W/ EXT CARBON DIOX ENDO SMARTCAP

## (undated) DEVICE — GOWN NONREINF XLG: Brand: MEDLINE INDUSTRIES, INC.

## (undated) DEVICE — Device: Brand: DEFENDO VALVE AND CONNECTOR KIT

## (undated) DEVICE — FORCEPS BX L240CM JAW DIA2.8MM L CAP W/ NDL MIC MESH TOOTH

## (undated) DEVICE — JELLY,LUBE,STERILE,FLIP TOP,TUBE,2-OZ: Brand: MEDLINE

## (undated) DEVICE — ADAPTER,CATHETER/SYRINGE/LUER,STERILE: Brand: MEDLINE

## (undated) DEVICE — FLUFF UNDERPAD,MODERATE: Brand: WINGS

## (undated) DEVICE — TUBING, SUCTION, 3/16" X 10', STRAIGHT: Brand: MEDLINE

## (undated) DEVICE — GAUZE,SPONGE,3"X3",4PLY,NS,LF: Brand: MEDLINE

## (undated) DEVICE — BASIN EMSIS 700ML GRAPHITE PLAS KID SHP GRAD

## (undated) DEVICE — CANNULA IV 18GA L15IN BLNT FILL LUERLOCK HUB MJCT

## (undated) DEVICE — YANKAUER,BULB TIP,W/O VENT,RIGID,STERILE: Brand: MEDLINE

## (undated) DEVICE — 4-PORT MANIFOLD: Brand: NEPTUNE 2